# Patient Record
(demographics unavailable — no encounter records)

---

## 2025-03-25 NOTE — HISTORY OF PRESENT ILLNESS
[de-identified] : 03/19/2025: The patient is a 35 year old M, right hand dominant who presents today complaining of right shoulder pain. Date of Injury/Onset: 1/28/2025 Pain:    At Rest: 0/10 With Activity:  0/10 Mechanism of injury: snowboarding  This is not a Work Related Injury being treated under Worker's Compensation. This is not an athletic injury occurring associated with an interscholastic or organized sports team. Quality of symptoms: instability  Improves with: rest, activity modification Worse with: OH movement, reaching behind, sleeping Prior treatment: [facility]: 1/29/2025 - reduction, sling ; Dr. Duran ([facility]) - XR, MRI, CT  Prior imaging: MRI [facility] 2/7/2025 Previous injury: first dislocation 1/2009 - skiing ; 10 dislocations since : recent prior to this incident 4/2022 - volleyball ; pt states he normally was able to self-reduce, but most recent incident required reduction at the hospital  Out of work/sport: currently working  School/Sport/Position/Occupation: sales - gym  Additional Information: None

## 2025-03-25 NOTE — HISTORY OF PRESENT ILLNESS
[de-identified] : 03/19/2025: The patient is a 35 year old M, right hand dominant who presents today complaining of right shoulder pain. Date of Injury/Onset: 1/28/2025 Pain:    At Rest: 0/10 With Activity:  0/10 Mechanism of injury: snowboarding  This is not a Work Related Injury being treated under Worker's Compensation. This is not an athletic injury occurring associated with an interscholastic or organized sports team. Quality of symptoms: instability  Improves with: rest, activity modification Worse with: OH movement, reaching behind, sleeping Prior treatment: [facility]: 1/29/2025 - reduction, sling ; Dr. Duran ([facility]) - XR, MRI, CT  Prior imaging: MRI [facility] 2/7/2025 Previous injury: first dislocation 1/2009 - skiing ; 10 dislocations since : recent prior to this incident 4/2022 - volleyball ; pt states he normally was able to self-reduce, but most recent incident required reduction at the hospital  Out of work/sport: currently working  School/Sport/Position/Occupation: sales - gym  Additional Information: None

## 2025-03-25 NOTE — IMAGING
[de-identified] :  RIGHT SHOULDER  Inspection: No swelling.  Palpation: Tenderness is noted at the anterior shoulder.  Range of motion: Full range of motion but with some pain. Strength: There is pain and discomfort with strength testing.  Neurological testing: motor and sensor intact distally. Ligament Stability and Special Tests:  Shoulder apprehension: pos Shoulder relocation: pos Obriens test: pos Biceps Active test: pos Drummond Labral Shear: pos Impingement testing: neg Isa testing: pain Whipple: neg Cross Body Adduction: neg  Right X-Ray Examination of the SHOULDER (2 views): no fractures,subluxations or dislocations.   Right X-Ray Examination of the SCAPULA 1 or 2 views shows: no significant abnormalities

## 2025-03-25 NOTE — DISCUSSION/SUMMARY
[de-identified] : Due to the patients instability event along with exam consistent with labral tear with bone errosion and glenoiid bone loss we will get a CT to eval integrity of the glenoid bony architecture - The patient was advised to apply ice (wrapped in a towel or protective covering) to the area daily (20 minutes at a time, 2-4X/day). - The patient was advised to modify their activities. - f/u after CT - Discussed latarjet versus arthroscopic labral repair

## 2025-03-25 NOTE — IMAGING
[de-identified] :  RIGHT SHOULDER  Inspection: No swelling.  Palpation: Tenderness is noted at the anterior shoulder.  Range of motion: Full range of motion but with some pain. Strength: There is pain and discomfort with strength testing.  Neurological testing: motor and sensor intact distally. Ligament Stability and Special Tests:  Shoulder apprehension: pos Shoulder relocation: pos Obriens test: pos Biceps Active test: pos Drummond Labral Shear: pos Impingement testing: neg Isa testing: pain Whipple: neg Cross Body Adduction: neg  Right X-Ray Examination of the SHOULDER (2 views): no fractures,subluxations or dislocations.   Right X-Ray Examination of the SCAPULA 1 or 2 views shows: no significant abnormalities

## 2025-03-25 NOTE — DISCUSSION/SUMMARY
[de-identified] : Due to the patients instability event along with exam consistent with labral tear with bone errosion and glenoiid bone loss we will get a CT to eval integrity of the glenoid bony architecture - The patient was advised to apply ice (wrapped in a towel or protective covering) to the area daily (20 minutes at a time, 2-4X/day). - The patient was advised to modify their activities. - f/u after CT - Discussed latarjet versus arthroscopic labral repair